# Patient Record
Sex: MALE | Race: WHITE | NOT HISPANIC OR LATINO | Employment: OTHER | URBAN - METROPOLITAN AREA
[De-identification: names, ages, dates, MRNs, and addresses within clinical notes are randomized per-mention and may not be internally consistent; named-entity substitution may affect disease eponyms.]

---

## 2021-10-07 ENCOUNTER — TELEPHONE (OUTPATIENT)
Dept: URGENT CARE | Facility: CLINIC | Age: 46
End: 2021-10-07

## 2021-10-07 NOTE — TELEPHONE ENCOUNTER
PT ASKED ABOUT BEING SEEN FOR NOT FEELING WELL, BRONCHITIS, HAD 3 COVID SHOT ON Monday. I TOLD PT WE'D BE HAPPY TO CHECK HIM IN AND EVALUATE HIM BUT HE MAY BE DIRECTED ON TO THE ER FOR FURTHER TESTING. PT SAID HE WOULD JUST GO TO THE ER.

## 2022-09-29 ENCOUNTER — OFFICE VISIT (OUTPATIENT)
Dept: UROLOGY | Facility: CLINIC | Age: 47
End: 2022-09-29

## 2022-09-29 VITALS
WEIGHT: 253 LBS | RESPIRATION RATE: 18 BRPM | BODY MASS INDEX: 43.19 KG/M2 | HEIGHT: 64 IN | OXYGEN SATURATION: 97 % | HEART RATE: 69 BPM

## 2022-09-29 DIAGNOSIS — Z30.09 UNWANTED FERTILITY: Primary | ICD-10-CM

## 2022-09-29 PROCEDURE — 99203 OFFICE O/P NEW LOW 30 MIN: CPT | Performed by: UROLOGY

## 2022-09-29 RX ORDER — TIZANIDINE 4 MG/1
TABLET ORAL
COMMUNITY
Start: 2022-09-16

## 2022-09-29 NOTE — PROGRESS NOTES
Office Note Vasectomy Consult     Patient Name: Ladarius Chatterjee  : 1975   MRN: [id number]     Chief Complaint:  Elective Sterilization.   Chief Complaint   Patient presents with   • Establish Care      VASECTOMY       Referring Provider: No ref. provider found    History of Present Illness: Ladarius Chatterjee is a 46 y.o. male who presents to Urology today with the desire for irreversible, elective sterilization.  He is not  and does not have children.  Is not in a current relationship.  He states he is getting older and wants a vasectomy because he is not interested in having children.  No scrotal pain or discomfort.  No LUTS or ED.  He is using condoms currently.     Subjective      Review of Systems: Review of Systems   Constitutional: Negative for chills, fatigue, fever and unexpected weight change.   HENT: Negative for congestion, rhinorrhea and sore throat.    Eyes: Negative for visual disturbance.   Respiratory: Negative for apnea, cough, chest tightness and shortness of breath.    Cardiovascular: Negative for chest pain.   Gastrointestinal: Negative for abdominal pain, constipation, diarrhea, nausea and vomiting.   Endocrine: Negative for polydipsia and polyuria.   Genitourinary: Negative for difficulty urinating, dysuria, enuresis, flank pain, frequency, genital sores, hematuria and urgency.   Musculoskeletal: Negative for gait problem.   Skin: Negative for rash and wound.   Allergic/Immunologic: Negative for immunocompromised state.   Neurological: Negative for dizziness, tremors, syncope, weakness and light-headedness.   Hematological: Does not bruise/bleed easily.      I have reviewed the ROS documented by my clinical staff, I have updated appropriately and I agree. Brian Reyes MD    Past Medical History:   Past Medical History:   Diagnosis Date   • Asthma    • Back pain    • Scoliosis        Past Surgical History: History reviewed. No pertinent surgical history.    Family  "History: History reviewed. No pertinent family history.    Social History:   Social History     Socioeconomic History   • Marital status: Single   Tobacco Use   • Smoking status: Former Smoker     Quit date:      Years since quittin.7   • Smokeless tobacco: Never Used       Medications:     Current Outpatient Medications:   •  albuterol sulfate  (90 Base) MCG/ACT inhaler, ProAir HFA 90 mcg/actuation aerosol inhaler  2 puffs qid prn, Disp: , Rfl:   •  ASPIRIN LOW DOSE 81 MG chewable tablet, , Disp: , Rfl:   •  chlorthalidone (HYGROTON) 25 MG tablet, , Disp: , Rfl:   •  EPINEPHrine (EPIPEN) 0.3 MG/0.3ML solution auto-injector injection, EpiPen 2-Brian 0.3 mg/0.3 mL injection, auto-injector  At Onset, Disp: , Rfl:   •  fexofenadine (ALLEGRA) 180 MG tablet, fexofenadine 180 mg tablet, Disp: , Rfl:   •  fluticasone (FLONASE) 50 MCG/ACT nasal spray, 1 spray into the nostril(s) as directed by provider Daily., Disp: , Rfl:   •  HYDROcodone-acetaminophen (NORCO)  MG per tablet, , Disp: , Rfl:   •  montelukast (SINGULAIR) 10 MG tablet, montelukast 10 mg tablet  take 1 tablet by mouth at bedtime, Disp: , Rfl:   •  tiZANidine (ZANAFLEX) 4 MG tablet, , Disp: , Rfl:     Allergies:   Allergies   Allergen Reactions   • Clindamycin Hives   • Pepsin Hives   • Vancomycin Unknown - High Severity and Hives   • Ceftriaxone Unknown - High Severity   • Clindamycin Hcl Unknown - High Severity         Objective     Physical Exam:   Vital Signs:   Vitals:    22 1322   Pulse: 69   Resp: 18   SpO2: 97%   Weight: 115 kg (253 lb)   Height: 162.6 cm (64.02\")     Body mass index is 43.41 kg/m².     Physical Exam  Vitals and nursing note reviewed.   Constitutional:       General: He is awake. He is not in acute distress.     Appearance: Normal appearance. He is well-developed. He is obese.   HENT:      Head: Normocephalic and atraumatic.      Right Ear: External ear normal.      Left Ear: External ear normal.      Nose: " Nose normal.   Eyes:      Conjunctiva/sclera: Conjunctivae normal.   Pulmonary:      Effort: Pulmonary effort is normal.   Abdominal:      General: There is no distension.      Palpations: Abdomen is soft. There is no mass.      Tenderness: There is no abdominal tenderness. There is no right CVA tenderness, left CVA tenderness, guarding or rebound.      Hernia: No hernia is present. There is no hernia in the left inguinal area or right inguinal area.   Genitourinary:     Pubic Area: No rash.       Penis: Normal.       Testes: Normal.      Rectum: No mass or tenderness. Normal anal tone.      Comments: Bilateral vas palpable  Musculoskeletal:      Cervical back: Normal range of motion.   Lymphadenopathy:      Lower Body: No right inguinal adenopathy. No left inguinal adenopathy.   Skin:     General: Skin is warm.   Neurological:      General: No focal deficit present.      Mental Status: He is alert and oriented to person, place, and time.   Psychiatric:         Behavior: Behavior normal. Behavior is cooperative.         Labs:   Brief Urine Lab Results     None               No results found for: GLUCOSE, CALCIUM, NA, K, CO2, CL, BUN, CREATININE, EGFRIFAFRI, EGFRIFNONA, BCR, ANIONGAP    No results found for: WBC, HGB, HCT, MCV, PLT    Images:   No Images in the past 120 days found..    Measures:   Tobacco:   Ladarius Chatterjee  reports that he quit smoking about 20 years ago. He has never used smokeless tobacco..      Urine Incontinence: Patient reports that he is not currently experiencing any symptoms of urinary incontinence.        Assessment / Plan      Assessment/Plan:   Mr. Chatterjee is a 46 y.o. male who presented to clinic today for irreversible elective sterilization.  WE discussed his options today between the office under local or with sedation.  He would like to have this done under sedation.  We will get him scheduled at his convenience.      Diagnoses and all orders for this visit:    1. Unwanted  fertility (Primary)         Patient Education:   The patient has requested a vasectomy for elective sterilization and the risks and benefits of the procedure were explained at length. The procedure itself was explained and postop followup explained at this point.  We discussed he will need a  to take him home. I extensively reviewed with him the likely postoperative recuperative period as well as the need to continue to use contraception until he is notified by me of his sterility.     He will undergo a semen analysis 3 months post-procedure AND 20 ejaculations before his first semen analysis check. He understands the potential side effects of anesthesia, bleeding, scrotal hematoma, wound infection, epididymo-orchitis, epididymal congestion, chronic testicular pain requiring further intervention/surgery, sperm granuloma, recanalization and risk of sperm return and/or pregnancy  (1 in 2000 as per the AUA guidelines). The patient wishes to proceed with vasectomy.     Follow Up:   Return for Recheck.    I spent approximately 30 minutes providing clinical care for this patient; including review of patient's chart and provider documentation, face to face time spent with patient in examination room (obtaining history, performing physical exam, discussing diagnosis and management options), placing orders, and completing patient documentation.     Brian Reyes MD  [facility] [place]